# Patient Record
Sex: MALE | Race: WHITE
[De-identification: names, ages, dates, MRNs, and addresses within clinical notes are randomized per-mention and may not be internally consistent; named-entity substitution may affect disease eponyms.]

---

## 2019-01-04 ENCOUNTER — HOSPITAL ENCOUNTER (EMERGENCY)
Dept: HOSPITAL 96 - M.ERS | Age: 34
Discharge: HOME | End: 2019-01-04
Payer: COMMERCIAL

## 2019-01-04 VITALS — BODY MASS INDEX: 25.18 KG/M2 | WEIGHT: 189.99 LBS | HEIGHT: 73 IN

## 2019-01-04 VITALS — DIASTOLIC BLOOD PRESSURE: 93 MMHG | SYSTOLIC BLOOD PRESSURE: 140 MMHG
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
## 2019-01-04 DIAGNOSIS — S60.042A: Primary | ICD-10-CM
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
## 2019-01-04 DIAGNOSIS — M25.442: ICD-10-CM

## 2020-02-20 ENCOUNTER — HOSPITAL ENCOUNTER (EMERGENCY)
Dept: HOSPITAL 96 - M.ERS | Age: 35
Discharge: HOME | End: 2020-02-20
Payer: COMMERCIAL

## 2020-02-20 VITALS — WEIGHT: 189.99 LBS | BODY MASS INDEX: 25.18 KG/M2 | HEIGHT: 73 IN

## 2020-02-20 VITALS — SYSTOLIC BLOOD PRESSURE: 140 MMHG | DIASTOLIC BLOOD PRESSURE: 77 MMHG

## 2020-02-20 DIAGNOSIS — S93.491A: Primary | ICD-10-CM
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
## 2020-02-20 DIAGNOSIS — F17.210: ICD-10-CM
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
## 2020-02-20 DIAGNOSIS — Z88.5: ICD-10-CM

## 2020-05-02 ENCOUNTER — HOSPITAL ENCOUNTER (EMERGENCY)
Dept: HOSPITAL 96 - M.ERS | Age: 35
Discharge: HOME | End: 2020-05-02
Payer: COMMERCIAL

## 2020-05-02 VITALS — WEIGHT: 185.01 LBS | HEIGHT: 73 IN | BODY MASS INDEX: 24.52 KG/M2

## 2020-05-02 VITALS — SYSTOLIC BLOOD PRESSURE: 143 MMHG | DIASTOLIC BLOOD PRESSURE: 84 MMHG

## 2020-05-02 DIAGNOSIS — F17.210: ICD-10-CM
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
## 2020-05-02 DIAGNOSIS — S61.215A: ICD-10-CM

## 2020-05-02 DIAGNOSIS — Z88.5: ICD-10-CM
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
## 2020-05-02 DIAGNOSIS — S61.213A: Primary | ICD-10-CM

## 2021-05-07 ENCOUNTER — HOSPITAL ENCOUNTER (EMERGENCY)
Dept: HOSPITAL 96 - M.ERS | Age: 36
Discharge: LEFT BEFORE DISCHARGE | End: 2021-05-07
Payer: COMMERCIAL

## 2021-05-07 VITALS — WEIGHT: 200 LBS | BODY MASS INDEX: 26.51 KG/M2 | HEIGHT: 73 IN

## 2021-05-07 VITALS — SYSTOLIC BLOOD PRESSURE: 150 MMHG | DIASTOLIC BLOOD PRESSURE: 112 MMHG

## 2021-05-07 DIAGNOSIS — Z53.21: Primary | ICD-10-CM

## 2021-05-29 ENCOUNTER — HOSPITAL ENCOUNTER (EMERGENCY)
Dept: HOSPITAL 96 - M.ERS | Age: 36
LOS: 1 days | Discharge: HOME | End: 2021-05-30
Payer: COMMERCIAL

## 2021-05-29 VITALS — HEIGHT: 73 IN | BODY MASS INDEX: 25.18 KG/M2 | WEIGHT: 189.99 LBS
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
## 2021-05-29 DIAGNOSIS — Z88.5: ICD-10-CM

## 2021-05-29 DIAGNOSIS — F17.210: ICD-10-CM

## 2021-05-29 DIAGNOSIS — S05.02XA: Primary | ICD-10-CM

## 2021-05-30 VITALS — SYSTOLIC BLOOD PRESSURE: 140 MMHG | DIASTOLIC BLOOD PRESSURE: 86 MMHG

## 2021-06-01 ENCOUNTER — HOSPITAL ENCOUNTER (EMERGENCY)
Dept: HOSPITAL 96 - M.ERS | Age: 36
Discharge: HOME | End: 2021-06-01
Payer: COMMERCIAL

## 2021-06-01 DIAGNOSIS — H57.89: ICD-10-CM

## 2021-06-01 DIAGNOSIS — Z53.21: ICD-10-CM

## 2021-06-01 DIAGNOSIS — H57.12: Primary | ICD-10-CM

## 2021-06-03 ENCOUNTER — HOSPITAL ENCOUNTER (EMERGENCY)
Dept: HOSPITAL 96 - M.ERS | Age: 36
Discharge: LEFT BEFORE DISCHARGE | End: 2021-06-03
Payer: COMMERCIAL

## 2021-06-03 DIAGNOSIS — Z53.21: ICD-10-CM

## 2021-06-03 DIAGNOSIS — H57.12: Primary | ICD-10-CM

## 2021-06-13 ENCOUNTER — HOSPITAL ENCOUNTER (EMERGENCY)
Dept: HOSPITAL 96 - M.ERS | Age: 36
Discharge: LEFT BEFORE DISCHARGE | End: 2021-06-13
Payer: COMMERCIAL

## 2021-06-13 VITALS — DIASTOLIC BLOOD PRESSURE: 75 MMHG | SYSTOLIC BLOOD PRESSURE: 110 MMHG

## 2021-06-13 VITALS — HEIGHT: 73 IN | WEIGHT: 189.99 LBS | BODY MASS INDEX: 25.18 KG/M2

## 2021-06-13 DIAGNOSIS — Z88.5: ICD-10-CM

## 2021-06-13 DIAGNOSIS — F17.210: ICD-10-CM

## 2021-06-13 DIAGNOSIS — R11.2: ICD-10-CM

## 2021-06-13 DIAGNOSIS — Z79.899: ICD-10-CM

## 2021-06-13 DIAGNOSIS — R10.84: Primary | ICD-10-CM

## 2021-06-13 LAB
ABSOLUTE BASOPHILS: 0.1 THOU/UL (ref 0–0.2)
ABSOLUTE EOSINOPHILS: 0.1 THOU/UL (ref 0–0.7)
ABSOLUTE MONOCYTES: 0.4 THOU/UL (ref 0–1.2)
ALBUMIN SERPL-MCNC: 4.1 G/DL (ref 3.4–5)
ALP SERPL-CCNC: 78 U/L (ref 46–116)
ALT SERPL-CCNC: 7 U/L (ref 30–65)
ANION GAP SERPL CALC-SCNC: 7 MMOL/L (ref 7–16)
AST SERPL-CCNC: 13 U/L (ref 15–37)
BASOPHILS NFR BLD AUTO: 1.2 %
BILIRUB SERPL-MCNC: 0.5 MG/DL
BILIRUB UR-MCNC: (no result) MG/DL
BUN SERPL-MCNC: 11 MG/DL (ref 7–18)
CALCIUM SERPL-MCNC: 8.8 MG/DL (ref 8.5–10.1)
CHLORIDE SERPL-SCNC: 105 MMOL/L (ref 98–107)
CO2 SERPL-SCNC: 28 MMOL/L (ref 21–32)
COCAINE UR-MCNC: POSITIVE NG/ML
COLOR UR: YELLOW
CREAT SERPL-MCNC: 0.9 MG/DL (ref 0.6–1.3)
EOSINOPHIL NFR BLD: 0.6 %
GLUCOSE SERPL-MCNC: 129 MG/DL (ref 70–99)
GRANULOCYTES NFR BLD MANUAL: 79.5 %
HCT VFR BLD CALC: 43.4 % (ref 42–52)
HGB BLD-MCNC: 15.2 GM/DL (ref 14–18)
KETONES UR STRIP-MCNC: NEGATIVE MG/DL
LIPASE: 82 U/L (ref 73–393)
LYMPHOCYTES # BLD: 1.6 THOU/UL (ref 0.8–5.3)
LYMPHOCYTES NFR BLD AUTO: 15.1 %
MCH RBC QN AUTO: 30.4 PG (ref 26–34)
MCHC RBC AUTO-ENTMCNC: 35 G/DL (ref 28–37)
MCV RBC: 86.8 FL (ref 80–100)
MONOCYTES NFR BLD: 3.6 %
MPV: 7.1 FL. (ref 7.2–11.1)
NEUTROPHILS # BLD: 8.5 THOU/UL (ref 1.6–8.1)
NUCLEATED RBCS: 0 /100WBC
PLATELET COUNT*: 258 THOU/UL (ref 150–400)
POTASSIUM SERPL-SCNC: 3.3 MMOL/L (ref 3.5–5.1)
PROT SERPL-MCNC: 7.3 G/DL (ref 6.4–8.2)
PROT UR QL STRIP: NEGATIVE
RBC # BLD AUTO: 5.01 MIL/UL (ref 4.5–6)
RBC # UR STRIP: NEGATIVE /UL
RDW-CV: 13.2 % (ref 10.5–14.5)
SODIUM SERPL-SCNC: 140 MMOL/L (ref 136–145)
SP GR UR STRIP: >= 1.03 (ref 1–1.03)
THC: POSITIVE
URINE CLARITY: CLEAR
URINE GLUCOSE-RANDOM: NEGATIVE
URINE LEUKOCYTES-REFLEX: NEGATIVE
URINE NITRITE-REFLEX: NEGATIVE
UROBILINOGEN UR STRIP-ACNC: 0.2 E.U./DL (ref 0.2–1)
WBC # BLD AUTO: 10.7 THOU/UL (ref 4–11)

## 2021-06-14 NOTE — [place] [address]
Phone:  [phone]                     ELECTROCARDIOGRAM REPORT      
_______________________________________________________________________________
 
Name:         BECCA YOO           Room:                     [facility]#:    [id number]     Account #:     [id number]  
Admission:    21    Attend Phys:                     
Discharge:    21    Date of Birth: [birth year]  
Date of Service: 21 1350  Report #:      [id number]
        [id number]
_______________________________________________________________________________
THIS REPORT FOR:  //name//                      
 
                         [facility] ED
                                       
Test Date:    2021               Test Time:    13:50:52
Pat Name:     BECCA SILVERMAN        Department:   
Patient ID:   [id number]            Room:          
Gender:                               Technician:   
:          1985               Requested By: Jerrell Redding
Order Number: [id number] MD:   Rob Ocampo
                                 Measurements
Intervals                              Axis          
Rate:         106                      P:            59
UT:           157                      QRS:          51
QRSD:         101                      T:            -15
QT:           354                                    
QTc:          471                                    
                           Interpretive Statements
Sinus tachycardia
Borderline repol abnrm, inferolateral leads
Compared to ECG 2014 10:17:45
Sinus rhythm no longer present
Electronically Signed On 2021 11:30:24 CDT by Rob Ocampo
[email or web address]
 
 
 
 
 
 
 
 
 
 
 
 
 
 
 
 
 
 
 
 
  <ELECTRONICALLY SIGNED>
                                           By: Rob Ocampo MD, [facility]      
  21     1130
D: 21 1350   _____________________________________
T: 21 1350   Rob Ocampo MD, [facility]        /EPI

## 2021-06-24 ENCOUNTER — HOSPITAL ENCOUNTER (EMERGENCY)
Dept: HOSPITAL 96 - M.ERS | Age: 36
Discharge: HOME | End: 2021-06-24
Payer: COMMERCIAL

## 2021-06-24 VITALS — DIASTOLIC BLOOD PRESSURE: 86 MMHG | SYSTOLIC BLOOD PRESSURE: 138 MMHG

## 2021-06-24 VITALS — WEIGHT: 195 LBS | HEIGHT: 73 IN | BODY MASS INDEX: 25.84 KG/M2

## 2021-06-24 DIAGNOSIS — Z88.5: ICD-10-CM

## 2021-06-24 DIAGNOSIS — H57.89: ICD-10-CM

## 2021-06-24 DIAGNOSIS — H57.11: Primary | ICD-10-CM

## 2021-06-24 DIAGNOSIS — Z98.890: ICD-10-CM

## 2021-06-25 ENCOUNTER — HOSPITAL ENCOUNTER (EMERGENCY)
Dept: HOSPITAL 96 - M.ERS | Age: 36
Discharge: LEFT BEFORE DISCHARGE | End: 2021-06-25
Payer: COMMERCIAL

## 2021-06-25 DIAGNOSIS — Z53.21: Primary | ICD-10-CM

## 2021-06-26 ENCOUNTER — HOSPITAL ENCOUNTER (EMERGENCY)
Dept: HOSPITAL 96 - M.ERS | Age: 36
Discharge: HOME | End: 2021-06-26
Payer: COMMERCIAL

## 2021-06-26 VITALS — DIASTOLIC BLOOD PRESSURE: 111 MMHG | SYSTOLIC BLOOD PRESSURE: 165 MMHG

## 2021-06-26 VITALS — WEIGHT: 189.99 LBS | HEIGHT: 73 IN | BODY MASS INDEX: 25.18 KG/M2

## 2021-06-26 DIAGNOSIS — R23.4: Primary | ICD-10-CM

## 2021-06-26 DIAGNOSIS — Z88.5: ICD-10-CM

## 2021-06-26 DIAGNOSIS — F17.210: ICD-10-CM

## 2021-06-26 DIAGNOSIS — B95.7: ICD-10-CM

## 2021-06-29 ENCOUNTER — HOSPITAL ENCOUNTER (EMERGENCY)
Dept: HOSPITAL 96 - M.ERS | Age: 36
Discharge: LEFT BEFORE DISCHARGE | End: 2021-06-29
Payer: COMMERCIAL

## 2021-06-29 VITALS — WEIGHT: 189.99 LBS | BODY MASS INDEX: 25.18 KG/M2 | HEIGHT: 73 IN

## 2021-06-29 VITALS — DIASTOLIC BLOOD PRESSURE: 87 MMHG | SYSTOLIC BLOOD PRESSURE: 139 MMHG

## 2021-06-29 DIAGNOSIS — Z53.21: Primary | ICD-10-CM

## 2021-07-11 ENCOUNTER — HOSPITAL ENCOUNTER (EMERGENCY)
Dept: HOSPITAL 96 - M.ERS | Age: 36
Discharge: LEFT BEFORE DISCHARGE | End: 2021-07-11
Payer: COMMERCIAL

## 2021-07-11 DIAGNOSIS — Z53.21: Primary | ICD-10-CM

## 2021-07-24 ENCOUNTER — HOSPITAL ENCOUNTER (EMERGENCY)
Dept: HOSPITAL 96 - M.ERS | Age: 36
Discharge: LEFT BEFORE DISCHARGE | End: 2021-07-24
Payer: COMMERCIAL

## 2021-07-24 DIAGNOSIS — Z53.21: Primary | ICD-10-CM

## 2021-07-29 ENCOUNTER — HOSPITAL ENCOUNTER (EMERGENCY)
Dept: HOSPITAL 96 - M.ERS | Age: 36
Discharge: LEFT BEFORE DISCHARGE | End: 2021-07-29
Payer: COMMERCIAL

## 2021-07-29 DIAGNOSIS — Z53.21: Primary | ICD-10-CM

## 2021-08-18 ENCOUNTER — HOSPITAL ENCOUNTER (EMERGENCY)
Dept: HOSPITAL 96 - M.ERS | Age: 36
Discharge: LEFT BEFORE DISCHARGE | End: 2021-08-18
Payer: COMMERCIAL

## 2021-08-18 VITALS — BODY MASS INDEX: 25.05 KG/M2 | HEIGHT: 73 IN | WEIGHT: 189 LBS

## 2021-08-18 VITALS — SYSTOLIC BLOOD PRESSURE: 116 MMHG | DIASTOLIC BLOOD PRESSURE: 79 MMHG

## 2021-08-18 DIAGNOSIS — Z53.21: Primary | ICD-10-CM
